# Patient Record
Sex: FEMALE | Race: WHITE | NOT HISPANIC OR LATINO | URBAN - METROPOLITAN AREA
[De-identification: names, ages, dates, MRNs, and addresses within clinical notes are randomized per-mention and may not be internally consistent; named-entity substitution may affect disease eponyms.]

---

## 2017-04-17 ENCOUNTER — TELEPHONE (OUTPATIENT)
Dept: INTERNAL MEDICINE | Facility: CLINIC | Age: 32
End: 2017-04-17

## 2017-04-17 NOTE — TELEPHONE ENCOUNTER
This is over the counter.  She can get it at her local pharmacy or [facility].  She does not need a script.

## 2017-04-17 NOTE — TELEPHONE ENCOUNTER
Pt informed, states that she went to her [facility] pharmacy on [facility] [address] and [address] and we have to phone in a script.

## 2017-04-17 NOTE — TELEPHONE ENCOUNTER
Called pt's pharmacy and verified that script was not needed, pt informed, states understanding.

## 2017-05-09 ENCOUNTER — OFFICE VISIT (OUTPATIENT)
Dept: INTERNAL MEDICINE | Facility: CLINIC | Age: 32
End: 2017-05-09

## 2017-05-09 VITALS
HEART RATE: 102 BPM | WEIGHT: 131 LBS | SYSTOLIC BLOOD PRESSURE: 102 MMHG | RESPIRATION RATE: 18 BRPM | HEIGHT: 66 IN | BODY MASS INDEX: 21.05 KG/M2 | DIASTOLIC BLOOD PRESSURE: 72 MMHG | TEMPERATURE: 98.3 F | OXYGEN SATURATION: 99 %

## 2017-05-09 DIAGNOSIS — J02.9 PHARYNGITIS, UNSPECIFIED ETIOLOGY: ICD-10-CM

## 2017-05-09 DIAGNOSIS — B37.31 YEAST VAGINITIS: ICD-10-CM

## 2017-05-09 DIAGNOSIS — D72.829 LEUKOCYTOSIS, UNSPECIFIED TYPE: ICD-10-CM

## 2017-05-09 DIAGNOSIS — B27.00 GAMMAHERPESVIRAL MONONUCLEOSIS WITHOUT COMPLICATION: Primary | ICD-10-CM

## 2017-05-09 LAB
EXPIRATION DATE: ABNORMAL
EXPIRATION DATE: NORMAL
HCT VFR BLDA CALC: 40 %
HETEROPH AB SER QL LA: POSITIVE
HGB BLDA-MCNC: 14.1 G/DL
INTERNAL CONTROL: ABNORMAL
INTERNAL CONTROL: NORMAL
LYMPHOCYTES # BLD: 7.4 %
Lab: ABNORMAL
Lab: NORMAL
MCH, POC: 31.8
MCHC, POC: 35.3
MCV, POC: 90
MONOCYTES # BLD: 4.1 %
PLATELET # BLD: 136 10*3/MM3
PMV BLD: 7.9 FL
POC NEUTROPHIL: 88.5 %
RBC, POC: 4.45
RDW, POC: 12.1
S PYO AG THROAT QL: NEGATIVE
WBC # BLD: 16.6 10*3/UL

## 2017-05-09 PROCEDURE — 87880 STREP A ASSAY W/OPTIC: CPT | Performed by: INTERNAL MEDICINE

## 2017-05-09 PROCEDURE — 99214 OFFICE O/P EST MOD 30 MIN: CPT | Performed by: INTERNAL MEDICINE

## 2017-05-09 PROCEDURE — 85025 COMPLETE CBC W/AUTO DIFF WBC: CPT | Performed by: INTERNAL MEDICINE

## 2017-05-09 PROCEDURE — 86308 HETEROPHILE ANTIBODY SCREEN: CPT | Performed by: INTERNAL MEDICINE

## 2017-05-09 RX ORDER — FLUCONAZOLE 150 MG/1
150 TABLET ORAL ONCE
Qty: 1 TABLET | Refills: 0 | Status: SHIPPED | OUTPATIENT
Start: 2017-05-09 | End: 2017-05-09

## 2017-05-09 RX ORDER — AZITHROMYCIN 250 MG/1
TABLET, FILM COATED ORAL
Qty: 6 TABLET | Refills: 0 | Status: SHIPPED | OUTPATIENT
Start: 2017-05-09 | End: 2018-05-24

## 2017-05-12 LAB
BACTERIA SPEC RESP CULT: NORMAL
BACTERIA SPEC RESP CULT: NORMAL

## 2017-05-16 ENCOUNTER — TELEPHONE (OUTPATIENT)
Dept: INTERNAL MEDICINE | Facility: CLINIC | Age: 32
End: 2017-05-16

## 2017-05-24 ENCOUNTER — TELEPHONE (OUTPATIENT)
Dept: INTERNAL MEDICINE | Facility: CLINIC | Age: 32
End: 2017-05-24

## 2018-05-24 ENCOUNTER — OFFICE VISIT (OUTPATIENT)
Dept: INTERNAL MEDICINE | Facility: CLINIC | Age: 33
End: 2018-05-24

## 2018-05-24 VITALS
WEIGHT: 132 LBS | HEIGHT: 66 IN | SYSTOLIC BLOOD PRESSURE: 114 MMHG | DIASTOLIC BLOOD PRESSURE: 72 MMHG | HEART RATE: 79 BPM | RESPIRATION RATE: 18 BRPM | OXYGEN SATURATION: 99 % | BODY MASS INDEX: 21.21 KG/M2

## 2018-05-24 DIAGNOSIS — L42 PITYRIASIS ROSEA: Primary | ICD-10-CM

## 2018-05-24 PROCEDURE — 99213 OFFICE O/P EST LOW 20 MIN: CPT | Performed by: INTERNAL MEDICINE

## 2018-05-24 RX ORDER — PREDNISONE 20 MG/1
20 TABLET ORAL DAILY
Qty: 4 TABLET | Refills: 0 | Status: SHIPPED | OUTPATIENT
Start: 2018-05-24 | End: 2019-02-18

## 2018-05-24 NOTE — PROGRESS NOTES
Chief Complaint  Albertina Foley is a 32 y.o. female who presents for Rash (Whole body, upper and lower extremities. Red spots, very itchy. )  .    History of Present Illness   Albertina is here for acute care for a rash.  It's been there for a few weeks.  Mainly on her trunk, back, arms and upper legs.  + pruritis.  She has been using a new detergent for the last three weeks.      Review of Systems   Constitution: Negative for chills and fever.   Skin: Positive for itching and rash.       Patient Active Problem List   Diagnosis   • Hemorrhoids, external   • Situational mixed anxiety and depressive disorder       Past Medical History:   Diagnosis Date   • Anxiety        Past Surgical History:   Procedure Laterality Date   • HIP ARTHROSCOPY W/ LABRAL REPAIR Left 11/2015    Dr. Jacinto   • OTHER SURGICAL HISTORY      ear surgery eustachian tube   • OTHER SURGICAL HISTORY      primary repair of knee ligament cruciate anterior left       Family History   Problem Relation Age of Onset   • Coronary artery disease Mother    • Other Father         unknown   • Coronary artery disease Maternal Grandmother    • Throat cancer Paternal Grandmother    • Diabetes Other         type 2   • Other Other         ulcerative colitis       Social History     Social History   • Marital status: Single     Spouse name: N/A   • Number of children: N/A   • Years of education: N/A     Occupational History   • Not on file.     Social History Main Topics   • Smoking status: Never Smoker   • Smokeless tobacco: Not on file   • Alcohol use Yes      Comment: 4 weekly   • Drug use: Unknown   • Sexual activity: Not on file     Other Topics Concern   • Not on file     Social History Narrative   • No narrative on file       Current Outpatient Prescriptions on File Prior to Visit   Medication Sig Dispense Refill   • hydrocortisone (PROCTOSOL HC) 2.5 % rectal cream Apply to the rectum bid as needed. 28.35 g 1   • [DISCONTINUED] azithromycin (ZITHROMAX Z-SULEMA)  "250 MG tablet Take 2 tablets the first day, then 1 tablet daily for 4 days. 6 tablet 0   • [DISCONTINUED] escitalopram (LEXAPRO) 10 MG tablet Take 1 tablet by mouth Daily. 30 tablet 6     No current facility-administered medications on file prior to visit.        Allergies   Allergen Reactions   • Amoxicillin    • Sulfa Antibiotics        Vitals:    05/24/18 1135   BP: 114/72   Pulse: 79   Resp: 18   SpO2: 99%   Weight: 59.9 kg (132 lb)   Height: 167.6 cm (65.98\")       Body mass index is 21.32 kg/m².    Objective   Physical Exam   Constitutional: She is oriented to person, place, and time. She appears well-developed and well-nourished. No distress.   Neurological: She is alert and oriented to person, place, and time.   Skin: Rash (mnimally raised salmon colored discreet rash on abd, back, arms.  ) noted.   Psychiatric: She has a normal mood and affect. Her behavior is normal.           Assessment/Plan   Diagnoses and all orders for this visit:    Pityriasis rosea    Other orders  -     predniSONE (DELTASONE) 20 MG tablet; Take 1 tablet by mouth Daily.        Discussion/Summary  Albertina is here for acute care.  This really looks like GA.  I did ask her to go back to her previous detergent.  I sent in a low dose pred to see if this would help with the itching at all.  Given the name of a dermatologist to follow up with if the rash is not improving.    No Follow-up on file.        "

## 2019-01-31 DIAGNOSIS — Z00.00 HEALTH CARE MAINTENANCE: Primary | ICD-10-CM

## 2019-02-14 LAB
25(OH)D3+25(OH)D2 SERPL-MCNC: 28.3 NG/ML (ref 30–100)
ALBUMIN SERPL-MCNC: 4.9 G/DL (ref 3.5–5.5)
ALBUMIN/GLOB SERPL: 2 {RATIO} (ref 1.2–2.2)
ALP SERPL-CCNC: 47 IU/L (ref 39–117)
ALT SERPL-CCNC: 13 IU/L (ref 0–32)
APPEARANCE UR: CLEAR
AST SERPL-CCNC: 15 IU/L (ref 0–40)
BACTERIA #/AREA URNS HPF: NORMAL /[HPF]
BASOPHILS # BLD AUTO: 0 X10E3/UL (ref 0–0.2)
BASOPHILS NFR BLD AUTO: 1 %
BILIRUB SERPL-MCNC: 0.8 MG/DL (ref 0–1.2)
BILIRUB UR QL STRIP: NEGATIVE
BUN SERPL-MCNC: 15 MG/DL (ref 6–20)
BUN/CREAT SERPL: 13 (ref 9–23)
CALCIUM SERPL-MCNC: 9.3 MG/DL (ref 8.7–10.2)
CHLORIDE SERPL-SCNC: 104 MMOL/L (ref 96–106)
CHOLEST SERPL-MCNC: 222 MG/DL (ref 100–199)
CO2 SERPL-SCNC: 23 MMOL/L (ref 20–29)
COLOR UR: YELLOW
CREAT SERPL-MCNC: 1.13 MG/DL (ref 0.57–1)
EOSINOPHIL # BLD AUTO: 0.3 X10E3/UL (ref 0–0.4)
EOSINOPHIL NFR BLD AUTO: 6 %
EPI CELLS #/AREA URNS HPF: NORMAL /HPF
ERYTHROCYTE [DISTWIDTH] IN BLOOD BY AUTOMATED COUNT: 12.5 % (ref 12.3–15.4)
GLOBULIN SER CALC-MCNC: 2.4 G/DL (ref 1.5–4.5)
GLUCOSE SERPL-MCNC: 96 MG/DL (ref 65–99)
GLUCOSE UR QL: NEGATIVE
HBA1C MFR BLD: 5.1 % (ref 4.8–5.6)
HCT VFR BLD AUTO: 42.9 % (ref 34–46.6)
HDLC SERPL-MCNC: 79 MG/DL
HGB BLD-MCNC: 14.4 G/DL (ref 11.1–15.9)
HGB UR QL STRIP: NEGATIVE
IMM GRANULOCYTES # BLD AUTO: 0 X10E3/UL (ref 0–0.1)
IMM GRANULOCYTES NFR BLD AUTO: 0 %
KETONES UR QL STRIP: NEGATIVE
LDLC SERPL CALC-MCNC: 129 MG/DL (ref 0–99)
LEUKOCYTE ESTERASE UR QL STRIP: NEGATIVE
LYMPHOCYTES # BLD AUTO: 2.2 X10E3/UL (ref 0.7–3.1)
LYMPHOCYTES NFR BLD AUTO: 39 %
MCH RBC QN AUTO: 30.5 PG (ref 26.6–33)
MCHC RBC AUTO-ENTMCNC: 33.6 G/DL (ref 31.5–35.7)
MCV RBC AUTO: 91 FL (ref 79–97)
MICRO URNS: NORMAL
MICRO URNS: NORMAL
MONOCYTES # BLD AUTO: 0.3 X10E3/UL (ref 0.1–0.9)
MONOCYTES NFR BLD AUTO: 5 %
MUCOUS THREADS URNS QL MICRO: PRESENT
NEUTROPHILS # BLD AUTO: 2.8 X10E3/UL (ref 1.4–7)
NEUTROPHILS NFR BLD AUTO: 49 %
NITRITE UR QL STRIP: NEGATIVE
PH UR STRIP: 5.5 [PH] (ref 5–7.5)
PLATELET # BLD AUTO: 215 X10E3/UL (ref 150–379)
POTASSIUM SERPL-SCNC: 4.1 MMOL/L (ref 3.5–5.2)
PROT SERPL-MCNC: 7.3 G/DL (ref 6–8.5)
PROT UR QL STRIP: NEGATIVE
RBC # BLD AUTO: 4.72 X10E6/UL (ref 3.77–5.28)
RBC #/AREA URNS HPF: NORMAL /HPF
SODIUM SERPL-SCNC: 142 MMOL/L (ref 134–144)
SP GR UR: 1.02 (ref 1–1.03)
TRIGL SERPL-MCNC: 70 MG/DL (ref 0–149)
TSH SERPL DL<=0.005 MIU/L-ACNC: 2.75 UIU/ML (ref 0.45–4.5)
URINALYSIS REFLEX: NORMAL
UROBILINOGEN UR STRIP-MCNC: 0.2 MG/DL (ref 0.2–1)
VLDLC SERPL CALC-MCNC: 14 MG/DL (ref 5–40)
WBC # BLD AUTO: 5.8 X10E3/UL (ref 3.4–10.8)
WBC #/AREA URNS HPF: NORMAL /HPF

## 2019-02-18 ENCOUNTER — OFFICE VISIT (OUTPATIENT)
Dept: INTERNAL MEDICINE | Facility: CLINIC | Age: 34
End: 2019-02-18

## 2019-02-18 VITALS
BODY MASS INDEX: 20.73 KG/M2 | OXYGEN SATURATION: 99 % | WEIGHT: 129 LBS | DIASTOLIC BLOOD PRESSURE: 70 MMHG | RESPIRATION RATE: 18 BRPM | HEIGHT: 66 IN | SYSTOLIC BLOOD PRESSURE: 102 MMHG | HEART RATE: 72 BPM

## 2019-02-18 DIAGNOSIS — R11.2 NON-INTRACTABLE VOMITING WITH NAUSEA, UNSPECIFIED VOMITING TYPE: ICD-10-CM

## 2019-02-18 DIAGNOSIS — R10.10 UPPER ABDOMINAL PAIN: ICD-10-CM

## 2019-02-18 DIAGNOSIS — Z00.00 HEALTH MAINTENANCE EXAMINATION: Primary | ICD-10-CM

## 2019-02-18 DIAGNOSIS — R79.89 ELEVATED SERUM CREATININE: ICD-10-CM

## 2019-02-18 DIAGNOSIS — F41.9 ANXIETY: ICD-10-CM

## 2019-02-18 DIAGNOSIS — M25.552 LEFT HIP PAIN: ICD-10-CM

## 2019-02-18 PROCEDURE — 99395 PREV VISIT EST AGE 18-39: CPT | Performed by: INTERNAL MEDICINE

## 2019-02-18 RX ORDER — SUCRALFATE 1 G/1
1 TABLET ORAL 4 TIMES DAILY
Qty: 120 TABLET | Refills: 0 | Status: SHIPPED | OUTPATIENT
Start: 2019-02-18

## 2019-02-18 RX ORDER — FLUOXETINE 10 MG/1
10 CAPSULE ORAL DAILY
Qty: 30 CAPSULE | Refills: 3 | Status: SHIPPED | OUTPATIENT
Start: 2019-02-18

## 2019-02-18 NOTE — PROGRESS NOTES
Chief Complaint  Albertina Foley is a 33 y.o. female who presents for Annual Exam (CPE)  .    History of Present Illness   Albertina is here for routine CPE.  For the last 3-4 months she has been getting super sick with vomiting.  It happens after eating.  She gets abd pain.  She feels like food is sticking in her esophagus low.  She's been taking some OTC meds. She has back pain across her upper back that's associated with the nausea and vomiting.  She takes ibuprofen about twice a week.    She has hip surgery several years ago for a torn labrum.  She is also having a lot of knee trouble.  Dr. Jacinto is who she saw.  He told her she may need a hip replacement.   She has a lot of stress and is having a hard time focusing.  She has a lot of anxiety.  We tried some meds in the past but they made her feel like a zombie.  She tried lexapro but it really made her loopy.     Pap: 3/2015, thinks she has had one since then.     Exercise: Gets a good amount of exercise, would love to do more, but has hip and knee issues. 3-4 times weekly currently     Immunization History   Administered Date(s) Administered   • Tdap 01/01/2006       Review of Systems   Constitution: Positive for malaise/fatigue. Negative for chills and fever.   HENT: Negative for hearing loss, hoarse voice and sore throat.    Eyes: Negative for blurred vision, double vision and visual disturbance.   Cardiovascular: Negative for chest pain, leg swelling and palpitations.   Respiratory: Negative for cough and shortness of breath.    Endocrine: Negative for polydipsia and polyuria.   Hematologic/Lymphatic: Does not bruise/bleed easily.   Skin: Negative for rash and suspicious lesions.   Musculoskeletal: Negative for arthritis and myalgias.   Gastrointestinal: Positive for bloating, abdominal pain, dysphagia, nausea and vomiting. Negative for change in bowel habit, constipation, diarrhea, hematochezia and melena.   Genitourinary: Negative for dysuria and hematuria.    Neurological: Negative for dizziness, headaches and light-headedness.   Psychiatric/Behavioral: Negative for depression. The patient is nervous/anxious.        Patient Active Problem List   Diagnosis   • Hemorrhoids, external   • Situational mixed anxiety and depressive disorder   • Anxiety       Past Medical History:   Diagnosis Date   • Anxiety        Past Surgical History:   Procedure Laterality Date   • HIP ARTHROSCOPY W/ LABRAL REPAIR Left 11/2015    Dr. Jacinto   • OTHER SURGICAL HISTORY      ear surgery eustachian tube   • OTHER SURGICAL HISTORY      primary repair of knee ligament cruciate anterior left       Family History   Problem Relation Age of Onset   • Coronary artery disease Mother    • Other Father         unknown   • Coronary artery disease Maternal Grandmother    • Throat cancer Paternal Grandmother    • Diabetes Other         type 2   • Other Other         ulcerative colitis       Social History     Socioeconomic History   • Marital status: Single     Spouse name: Not on file   • Number of children: Not on file   • Years of education: Not on file   • Highest education level: Not on file   Social Needs   • Financial resource strain: Not on file   • Food insecurity - worry: Not on file   • Food insecurity - inability: Not on file   • Transportation needs - medical: Not on file   • Transportation needs - non-medical: Not on file   Occupational History   • Not on file   Tobacco Use   • Smoking status: Never Smoker   • Smokeless tobacco: Never Used   Substance and Sexual Activity   • Alcohol use: Yes     Comment: 4 weekly   • Drug use: No   • Sexual activity: Not on file   Other Topics Concern   • Not on file   Social History Narrative   • Not on file       Current Outpatient Medications on File Prior to Visit   Medication Sig Dispense Refill   • [DISCONTINUED] hydrocortisone (PROCTOSOL HC) 2.5 % rectal cream Apply to the rectum bid as needed. 28.35 g 1   • [DISCONTINUED] predniSONE (DELTASONE) 20 MG  "tablet Take 1 tablet by mouth Daily. 4 tablet 0     No current facility-administered medications on file prior to visit.        Allergies   Allergen Reactions   • Amoxicillin    • Sulfa Antibiotics        Vitals:    02/18/19 1026   BP: 102/70   Pulse: 72   Resp: 18   SpO2: 99%   Weight: 58.5 kg (129 lb)   Height: 167.6 cm (65.98\")       Body mass index is 20.83 kg/m².    Objective   Physical Exam   Constitutional: She is oriented to person, place, and time. She appears well-developed and well-nourished. No distress.   HENT:   Head: Normocephalic and atraumatic.   Nose: Nose normal.   Mouth/Throat: Oropharynx is clear and moist.   Eyes: Conjunctivae and EOM are normal. Pupils are equal, round, and reactive to light. No scleral icterus.   Neck: Normal range of motion. Neck supple. No thyromegaly present.   Cardiovascular: Normal rate, regular rhythm and normal heart sounds. Exam reveals no gallop and no friction rub.   No murmur heard.  Pulses:       Carotid pulses are 2+ on the right side, and 2+ on the left side.       Femoral pulses are 2+ on the right side, and 2+ on the left side.       Dorsalis pedis pulses are 2+ on the right side, and 2+ on the left side.        Posterior tibial pulses are 2+ on the right side, and 2+ on the left side.   Pulmonary/Chest: Effort normal and breath sounds normal. No respiratory distress. She has no wheezes. She has no rales. Right breast exhibits no mass and no nipple discharge. Left breast exhibits no mass and no nipple discharge.   Abdominal: Soft. Bowel sounds are normal. She exhibits no distension and no mass. There is tenderness in the epigastric area. There is no rebound, no guarding and negative Keller's sign.   Musculoskeletal: Normal range of motion. She exhibits no edema.     Vascular Status -  Her right foot exhibits no edema. Her left foot exhibits no edema.  Skin Integrity  -  Her right foot skin is intact.Her left foot skin is intact..  Lymphadenopathy:     She has " no cervical adenopathy.     She has no axillary adenopathy.        Right: No inguinal and no supraclavicular adenopathy present.        Left: No inguinal and no supraclavicular adenopathy present.   Neurological: She is alert and oriented to person, place, and time. She has normal reflexes. No cranial nerve deficit.   Skin: Skin is warm and dry.   Psychiatric: She has a normal mood and affect. Her speech is normal and behavior is normal. Judgment and thought content normal. Cognition and memory are normal.   Vitals reviewed.        Results for orders placed or performed in visit on 01/31/19   Comprehensive Metabolic Panel   Result Value Ref Range    Glucose 96 65 - 99 mg/dL    BUN 15 6 - 20 mg/dL    Creatinine 1.13 (H) 0.57 - 1.00 mg/dL    eGFR Non African Am 63 >59 mL/min/1.73    eGFR African Am 73 >59 mL/min/1.73    BUN/Creatinine Ratio 13 9 - 23    Sodium 142 134 - 144 mmol/L    Potassium 4.1 3.5 - 5.2 mmol/L    Chloride 104 96 - 106 mmol/L    Total CO2 23 20 - 29 mmol/L    Calcium 9.3 8.7 - 10.2 mg/dL    Total Protein 7.3 6.0 - 8.5 g/dL    Albumin 4.9 3.5 - 5.5 g/dL    Globulin 2.4 1.5 - 4.5 g/dL    A/G Ratio 2.0 1.2 - 2.2    Total Bilirubin 0.8 0.0 - 1.2 mg/dL    Alkaline Phosphatase 47 39 - 117 IU/L    AST (SGOT) 15 0 - 40 IU/L    ALT (SGPT) 13 0 - 32 IU/L   Lipid Panel   Result Value Ref Range    Total Cholesterol 222 (H) 100 - 199 mg/dL    Triglycerides 70 0 - 149 mg/dL    HDL Cholesterol 79 >39 mg/dL    VLDL Cholesterol 14 5 - 40 mg/dL    LDL Cholesterol  129 (H) 0 - 99 mg/dL   TSH Rfx On Abnormal To Free T4   Result Value Ref Range    TSH 2.750 0.450 - 4.500 uIU/mL   Vitamin D 25 Hydroxy   Result Value Ref Range    25 Hydroxy, Vitamin D 28.3 (L) 30.0 - 100.0 ng/mL   Hemoglobin A1c   Result Value Ref Range    Hemoglobin A1C 5.1 4.8 - 5.6 %   Urinalysis With Culture If Indicated - Urine, Clean Catch   Result Value Ref Range    Specific Gravity, UA 1.023 1.005 - 1.030    pH, UA 5.5 5.0 - 7.5    Color, UA  Yellow Yellow    Appearance, UA Clear Clear    Leukocytes, UA Negative Negative    Protein Negative Negative/Trace    Glucose, UA Negative Negative    Ketones Negative Negative    Blood, UA Negative Negative    Bilirubin, UA Negative Negative    Urobilinogen, UA 0.2 0.2 - 1.0 mg/dL    Nitrite, UA Negative Negative    Microscopic Examination Comment     Microscopic Examination See below:     Urinalysis Reflex Comment    Microscopic Examination -   Result Value Ref Range    WBC, UA 0-5 0 - 5 /hpf    RBC, UA None seen 0 - 2 /hpf    Epithelial Cells (non renal) 0-10 0 - 10 /hpf    Mucus, UA Present Not Estab.    Bacteria, UA Few None seen/Few   CBC & Differential   Result Value Ref Range    WBC 5.8 3.4 - 10.8 x10E3/uL    RBC 4.72 3.77 - 5.28 x10E6/uL    Hemoglobin 14.4 11.1 - 15.9 g/dL    Hematocrit 42.9 34.0 - 46.6 %    MCV 91 79 - 97 fL    MCH 30.5 26.6 - 33.0 pg    MCHC 33.6 31.5 - 35.7 g/dL    RDW 12.5 12.3 - 15.4 %    Platelets 215 150 - 379 x10E3/uL    Neutrophil Rel % 49 Not Estab. %    Lymphocyte Rel % 39 Not Estab. %    Monocyte Rel % 5 Not Estab. %    Eosinophil Rel % 6 Not Estab. %    Basophil Rel % 1 Not Estab. %    Neutrophils Absolute 2.8 1.4 - 7.0 x10E3/uL    Lymphocytes Absolute 2.2 0.7 - 3.1 x10E3/uL    Monocytes Absolute 0.3 0.1 - 0.9 x10E3/uL    Eosinophils Absolute 0.3 0.0 - 0.4 x10E3/uL    Basophils Absolute 0.0 0.0 - 0.2 x10E3/uL    Immature Granulocyte Rel % 0 Not Estab. %    Immature Grans Absolute 0.0 0.0 - 0.1 x10E3/uL       Assessment/Plan   Diagnoses and all orders for this visit:    Health maintenance examination    Left hip pain  -     Ambulatory Referral to Orthopedic Surgery    Anxiety    Non-intractable vomiting with nausea, unspecified vomiting type  -     US Gallbladder; Future    Upper abdominal pain  -     US Gallbladder; Future    Elevated serum creatinine  -     US Renal Bilateral; Future    Other orders  -     FLUoxetine (PROzac) 10 MG capsule; Take 1 capsule by mouth Daily.  -      sucralfate (CARAFATE) 1 g tablet; Take 1 tablet by mouth 4 (Four) Times a Day.        Discussion/Summary  Albertina is here for routine follow up.  She is due for pap. Encouraged to call her gyn to set this up.  She declines shots.  She has a number of issues that need addressing.  Her abd pain and vomiting are the biggest issues.  She has seen Dr. Moreno years ago.  Will put in a referral for her to go back to her.  Most likely will needs EGD and possibly C-scope.  I have put her on carafate in the meantime to see if this helps.  Will also get a GB u/s.  We need to look at her kidneys as well given the bump in her creatinine.  Will repeat labs in 6 weeks to recheck her kidney function.  For her anxiety, we are going to try a very low dose of fluoxetine.        No Follow-up on file.

## 2019-10-07 ENCOUNTER — OFFICE (OUTPATIENT)
Dept: URBAN - METROPOLITAN AREA CLINIC 75 | Facility: CLINIC | Age: 34
End: 2019-10-07
Payer: COMMERCIAL

## 2019-10-07 VITALS
DIASTOLIC BLOOD PRESSURE: 62 MMHG | WEIGHT: 130 LBS | HEIGHT: 66 IN | SYSTOLIC BLOOD PRESSURE: 112 MMHG | HEART RATE: 60 BPM

## 2019-10-07 DIAGNOSIS — K64.9 UNSPECIFIED HEMORRHOIDS: ICD-10-CM

## 2019-10-07 DIAGNOSIS — R11.2 NAUSEA WITH VOMITING, UNSPECIFIED: ICD-10-CM

## 2019-10-07 DIAGNOSIS — K21.9 GASTRO-ESOPHAGEAL REFLUX DISEASE WITHOUT ESOPHAGITIS: ICD-10-CM

## 2019-10-07 PROCEDURE — 99204 OFFICE O/P NEW MOD 45 MIN: CPT | Performed by: INTERNAL MEDICINE

## 2019-10-07 RX ORDER — PANTOPRAZOLE SODIUM 40 MG/1
40 TABLET, DELAYED RELEASE ORAL
Qty: 30 | Refills: 11 | Status: ACTIVE
Start: 2019-10-07

## 2019-10-28 VITALS
OXYGEN SATURATION: 91 % | HEART RATE: 90 BPM | DIASTOLIC BLOOD PRESSURE: 74 MMHG | RESPIRATION RATE: 16 BRPM | TEMPERATURE: 98.3 F | OXYGEN SATURATION: 85 % | WEIGHT: 130 LBS | OXYGEN SATURATION: 98 % | HEART RATE: 100 BPM | HEART RATE: 89 BPM | OXYGEN SATURATION: 99 % | SYSTOLIC BLOOD PRESSURE: 110 MMHG | OXYGEN SATURATION: 92 % | HEART RATE: 94 BPM | DIASTOLIC BLOOD PRESSURE: 75 MMHG | OXYGEN SATURATION: 97 % | HEART RATE: 88 BPM | DIASTOLIC BLOOD PRESSURE: 85 MMHG | SYSTOLIC BLOOD PRESSURE: 119 MMHG | RESPIRATION RATE: 18 BRPM | RESPIRATION RATE: 6 BRPM | SYSTOLIC BLOOD PRESSURE: 117 MMHG | SYSTOLIC BLOOD PRESSURE: 106 MMHG | RESPIRATION RATE: 8 BRPM | DIASTOLIC BLOOD PRESSURE: 80 MMHG | HEIGHT: 66 IN | SYSTOLIC BLOOD PRESSURE: 115 MMHG | OXYGEN SATURATION: 100 % | DIASTOLIC BLOOD PRESSURE: 62 MMHG | SYSTOLIC BLOOD PRESSURE: 101 MMHG | HEART RATE: 98 BPM | HEART RATE: 68 BPM

## 2019-10-29 ENCOUNTER — OFFICE (OUTPATIENT)
Dept: URBAN - METROPOLITAN AREA PATHOLOGY 4 | Facility: PATHOLOGY | Age: 34
End: 2019-10-29
Payer: COMMERCIAL

## 2019-10-29 ENCOUNTER — AMBULATORY SURGICAL CENTER (OUTPATIENT)
Dept: URBAN - METROPOLITAN AREA SURGERY 17 | Facility: SURGERY | Age: 34
End: 2019-10-29
Payer: COMMERCIAL

## 2019-10-29 DIAGNOSIS — K44.9 DIAPHRAGMATIC HERNIA WITHOUT OBSTRUCTION OR GANGRENE: ICD-10-CM

## 2019-10-29 DIAGNOSIS — K29.70 GASTRITIS, UNSPECIFIED, WITHOUT BLEEDING: ICD-10-CM

## 2019-10-29 DIAGNOSIS — R11.2 NAUSEA WITH VOMITING, UNSPECIFIED: ICD-10-CM

## 2019-10-29 DIAGNOSIS — K29.50 UNSPECIFIED CHRONIC GASTRITIS WITHOUT BLEEDING: ICD-10-CM

## 2019-10-29 DIAGNOSIS — K21.9 GASTRO-ESOPHAGEAL REFLUX DISEASE WITHOUT ESOPHAGITIS: ICD-10-CM

## 2019-10-29 LAB
GI HISTOLOGY: A. UNSPECIFIED: (no result)
GI HISTOLOGY: B. UNSPECIFIED: (no result)
GI HISTOLOGY: C. SELECT: (no result)
GI HISTOLOGY: D. UNSPECIFIED: (no result)
GI HISTOLOGY: PDF REPORT: (no result)

## 2019-10-29 PROCEDURE — 43239 EGD BIOPSY SINGLE/MULTIPLE: CPT | Performed by: INTERNAL MEDICINE

## 2019-10-29 PROCEDURE — 88305 TISSUE EXAM BY PATHOLOGIST: CPT | Performed by: INTERNAL MEDICINE

## 2019-11-18 ENCOUNTER — OFFICE (OUTPATIENT)
Dept: URBAN - METROPOLITAN AREA CLINIC 75 | Facility: CLINIC | Age: 34
End: 2019-11-18
Payer: COMMERCIAL

## 2019-11-18 VITALS — HEIGHT: 66 IN

## 2019-11-18 DIAGNOSIS — K64.1 SECOND DEGREE HEMORRHOIDS: ICD-10-CM

## 2019-11-18 PROBLEM — K64.8 HEMORRHOIDS, INTERNAL: Status: ACTIVE | Noted: 2019-11-18

## 2019-11-18 PROCEDURE — 46221 LIGATION OF HEMORRHOID(S): CPT | Performed by: INTERNAL MEDICINE

## 2019-11-18 NOTE — SERVICEHPINOTES
Ms. Calloway is a pleasant 34-year-old white female who tells me that when she is on her. She gets constipated and during that time her hemorrhoids will move in and out. They are painful, she has occasional bleeding. They do go back in on their own and when her bowels are more regular she seems to be fine the rest of the month. She presents today for hemorrhoid banding because of her grade 2 hemorrhoids.

## 2019-12-02 ENCOUNTER — OFFICE (OUTPATIENT)
Dept: URBAN - METROPOLITAN AREA CLINIC 75 | Facility: CLINIC | Age: 34
End: 2019-12-02
Payer: COMMERCIAL

## 2019-12-02 VITALS — HEIGHT: 66 IN

## 2019-12-02 DIAGNOSIS — K64.1 SECOND DEGREE HEMORRHOIDS: ICD-10-CM

## 2019-12-02 PROCEDURE — 46221 LIGATION OF HEMORRHOID(S): CPT | Performed by: INTERNAL MEDICINE

## 2019-12-02 NOTE — SERVICEHPINOTES
Ms. Calloway is a pleasant 34-year-old white female who tells me that when she is on her. She gets constipated and during that time her hemorrhoids will move in and out. They are painful, she has occasional bleeding. They do go back in on their own and when her bowels are more regular she seems to be fine the rest of the month. She presents today for hemorrhoid banding because of her grade 2 hemorrhoids. She presents for her second banding.

## 2019-12-16 ENCOUNTER — OFFICE (OUTPATIENT)
Dept: URBAN - METROPOLITAN AREA CLINIC 75 | Facility: CLINIC | Age: 34
End: 2019-12-16
Payer: COMMERCIAL

## 2019-12-16 VITALS — HEIGHT: 66 IN

## 2019-12-16 DIAGNOSIS — K64.1 SECOND DEGREE HEMORRHOIDS: ICD-10-CM

## 2019-12-16 DIAGNOSIS — K64.8 OTHER HEMORRHOIDS: ICD-10-CM

## 2019-12-16 PROCEDURE — 46221 LIGATION OF HEMORRHOID(S): CPT | Performed by: INTERNAL MEDICINE

## 2019-12-16 NOTE — SERVICEHPINOTES
Ms. Calloway is a pleasant 34-year-old white female who tells me that when she is on her. She gets constipated and during that time her hemorrhoids will move in and out. They are painful, she has occasional bleeding. They do go back in on their own and when her bowels are more regular she seems to be fine the rest of the month. She presents today for hemorrhoid banding because of her grade 2 hemorrhoids. She presents for her 3rd banding.

## 2025-03-11 ENCOUNTER — OFFICE (OUTPATIENT)
Dept: URBAN - METROPOLITAN AREA CLINIC 64 | Facility: CLINIC | Age: 40
End: 2025-03-11
Payer: MEDICARE

## 2025-03-11 VITALS
SYSTOLIC BLOOD PRESSURE: 140 MMHG | HEIGHT: 66 IN | WEIGHT: 139 LBS | HEART RATE: 66 BPM | DIASTOLIC BLOOD PRESSURE: 86 MMHG

## 2025-03-11 DIAGNOSIS — R11.2 NAUSEA WITH VOMITING, UNSPECIFIED: ICD-10-CM

## 2025-03-11 DIAGNOSIS — R10.13 EPIGASTRIC PAIN: ICD-10-CM

## 2025-03-11 DIAGNOSIS — Z83.710 FAMILY HISTORY OF ADENOMATOUS AND SERRATED POLYPS: ICD-10-CM

## 2025-03-11 PROCEDURE — 99204 OFFICE O/P NEW MOD 45 MIN: CPT | Performed by: INTERNAL MEDICINE

## 2025-03-18 ENCOUNTER — ON CAMPUS - OUTPATIENT (OUTPATIENT)
Dept: URBAN - METROPOLITAN AREA HOSPITAL 2 | Facility: HOSPITAL | Age: 40
End: 2025-03-18
Payer: MEDICARE

## 2025-03-18 ENCOUNTER — OFFICE (OUTPATIENT)
Dept: URBAN - METROPOLITAN AREA PATHOLOGY 19 | Facility: PATHOLOGY | Age: 40
End: 2025-03-18
Payer: MEDICARE

## 2025-03-18 VITALS
SYSTOLIC BLOOD PRESSURE: 119 MMHG | SYSTOLIC BLOOD PRESSURE: 114 MMHG | TEMPERATURE: 98.6 F | DIASTOLIC BLOOD PRESSURE: 69 MMHG | DIASTOLIC BLOOD PRESSURE: 58 MMHG | DIASTOLIC BLOOD PRESSURE: 71 MMHG | WEIGHT: 134 LBS | SYSTOLIC BLOOD PRESSURE: 118 MMHG | SYSTOLIC BLOOD PRESSURE: 115 MMHG | HEART RATE: 79 BPM | DIASTOLIC BLOOD PRESSURE: 80 MMHG | RESPIRATION RATE: 14 BRPM | DIASTOLIC BLOOD PRESSURE: 76 MMHG | HEART RATE: 70 BPM | HEIGHT: 66 IN | RESPIRATION RATE: 16 BRPM | HEART RATE: 78 BPM | DIASTOLIC BLOOD PRESSURE: 74 MMHG | RESPIRATION RATE: 19 BRPM | RESPIRATION RATE: 20 BRPM | HEART RATE: 87 BPM | SYSTOLIC BLOOD PRESSURE: 110 MMHG | DIASTOLIC BLOOD PRESSURE: 70 MMHG | HEART RATE: 81 BPM | SYSTOLIC BLOOD PRESSURE: 132 MMHG | OXYGEN SATURATION: 97 % | OXYGEN SATURATION: 100 % | HEART RATE: 82 BPM | HEART RATE: 84 BPM | RESPIRATION RATE: 15 BRPM | SYSTOLIC BLOOD PRESSURE: 107 MMHG | DIASTOLIC BLOOD PRESSURE: 88 MMHG | HEART RATE: 94 BPM

## 2025-03-18 DIAGNOSIS — Z83.710 FAMILY HISTORY OF ADENOMATOUS AND SERRATED POLYPS: ICD-10-CM

## 2025-03-18 DIAGNOSIS — R10.13 EPIGASTRIC PAIN: ICD-10-CM

## 2025-03-18 DIAGNOSIS — K62.1 RECTAL POLYP: ICD-10-CM

## 2025-03-18 DIAGNOSIS — K31.89 OTHER DISEASES OF STOMACH AND DUODENUM: ICD-10-CM

## 2025-03-18 DIAGNOSIS — K63.5 POLYP OF COLON: ICD-10-CM

## 2025-03-18 DIAGNOSIS — Z12.11 ENCOUNTER FOR SCREENING FOR MALIGNANT NEOPLASM OF COLON: ICD-10-CM

## 2025-03-18 PROCEDURE — 45385 COLONOSCOPY W/LESION REMOVAL: CPT | Mod: 33 | Performed by: INTERNAL MEDICINE

## 2025-03-18 PROCEDURE — 88305 TISSUE EXAM BY PATHOLOGIST: CPT | Performed by: PATHOLOGY

## 2025-03-18 PROCEDURE — 43239 EGD BIOPSY SINGLE/MULTIPLE: CPT | Performed by: INTERNAL MEDICINE
